# Patient Record
Sex: FEMALE | Race: WHITE | Employment: OTHER | URBAN - METROPOLITAN AREA
[De-identification: names, ages, dates, MRNs, and addresses within clinical notes are randomized per-mention and may not be internally consistent; named-entity substitution may affect disease eponyms.]

---

## 2020-07-13 VITALS
SYSTOLIC BLOOD PRESSURE: 111 MMHG | DIASTOLIC BLOOD PRESSURE: 61 MMHG | OXYGEN SATURATION: 95 % | HEART RATE: 49 BPM | RESPIRATION RATE: 16 BRPM | TEMPERATURE: 96.9 F

## 2020-07-13 RX ORDER — DULOXETIN HYDROCHLORIDE 30 MG/1
30 CAPSULE, DELAYED RELEASE ORAL DAILY
COMMUNITY

## 2020-07-13 RX ORDER — LOSARTAN POTASSIUM 100 MG/1
100 TABLET ORAL DAILY
COMMUNITY

## 2020-07-13 RX ORDER — DALFAMPRIDINE 10 MG/1
10 TABLET, FILM COATED, EXTENDED RELEASE ORAL 2 TIMES DAILY
COMMUNITY
End: 2020-12-09

## 2020-07-13 RX ORDER — ASPIRIN 325 MG
TABLET, DELAYED RELEASE (ENTERIC COATED) ORAL DAILY
COMMUNITY

## 2020-07-13 RX ORDER — NYSTATIN 100000 [USP'U]/G
1 POWDER TOPICAL 2 TIMES DAILY PRN
COMMUNITY

## 2020-07-13 RX ORDER — ACETAMINOPHEN 500 MG
1000 TABLET ORAL 3 TIMES DAILY
COMMUNITY

## 2020-07-13 RX ORDER — CHOLECALCIFEROL (VITAMIN D3) 50 MCG
4000 TABLET ORAL DAILY
COMMUNITY

## 2020-07-13 RX ORDER — TERIFLUNOMIDE 14 MG/1
14 TABLET, FILM COATED ORAL DAILY
COMMUNITY

## 2020-07-13 RX ORDER — POLYETHYLENE GLYCOL 3350 17 G/17G
17 POWDER, FOR SOLUTION ORAL DAILY
COMMUNITY

## 2020-07-13 RX ORDER — MODAFINIL 200 MG/1
200 TABLET ORAL DAILY
COMMUNITY

## 2020-07-13 RX ORDER — HYDROCHLOROTHIAZIDE 25 MG/1
25 TABLET ORAL DAILY
COMMUNITY

## 2020-07-13 RX ORDER — PANTOPRAZOLE SODIUM 20 MG/1
20 TABLET, DELAYED RELEASE ORAL DAILY
COMMUNITY

## 2020-07-13 RX ORDER — FLUCONAZOLE 150 MG/1
150 TABLET ORAL ONCE
COMMUNITY
Start: 2020-07-06 | End: 2020-07-17

## 2020-07-13 RX ORDER — METHENAMINE HIPPURATE 1000 MG/1
1 TABLET ORAL 2 TIMES DAILY
COMMUNITY

## 2020-07-13 NOTE — NURSING NOTE
Chief Complaint   Patient presents with     longterm Regulatory       Initial /61   Pulse (!) 49   Temp 96.9  F (36.1  C)   Resp 16   SpO2 95%  There is no height or weight on file to calculate BMI.  Medication Reconciliation: complete     Liliana Grossman LPN

## 2020-07-16 ENCOUNTER — TRANSFERRED RECORDS (OUTPATIENT)
Dept: HEALTH INFORMATION MANAGEMENT | Facility: CLINIC | Age: 55
End: 2020-07-16

## 2020-07-16 ENCOUNTER — VIRTUAL VISIT (OUTPATIENT)
Dept: FAMILY MEDICINE | Facility: OTHER | Age: 55
End: 2020-07-16
Attending: FAMILY MEDICINE
Payer: COMMERCIAL

## 2020-07-16 DIAGNOSIS — G35 MULTIPLE SCLEROSIS (H): ICD-10-CM

## 2020-07-16 DIAGNOSIS — N31.9 NEUROGENIC BLADDER: ICD-10-CM

## 2020-07-16 DIAGNOSIS — Z78.9 NURSING HOME RESIDENT: Primary | ICD-10-CM

## 2020-07-16 PROCEDURE — 99304 1ST NF CARE SF/LOW MDM 25: CPT | Mod: 95 | Performed by: FAMILY MEDICINE

## 2020-07-28 PROBLEM — Z78.9 NURSING HOME RESIDENT: Status: ACTIVE | Noted: 2020-07-28

## 2020-07-28 NOTE — PROGRESS NOTES
"Savita Jones is a 55 year old female who is being evaluated via a billable video visit.      The patient has been notified of following:     \"This video visit will be conducted via a call between you and your physician/provider. We have found that certain health care needs can be provided without the need for an in-person physical exam.  This service lets us provide the care you need with a video conversation.  If a prescription is necessary we can send it directly to your pharmacy.  If lab work is needed we can place an order for that and you can then stop by our lab to have the test done at a later time.    Video visits are billed at different rates depending on your insurance coverage.  Please reach out to your insurance provider with any questions.    If during the course of the call the physician/provider feels a video visit is not appropriate, you will not be charged for this service.\"    Patient has given verbal consent for Video visit? Yes  How would you like to obtain your AVS? Mail a copy  If you are dropped from the video visit, the video invite should be resent to: Other e-mail: [email or web address]  Will anyone else be joining your video visit? No      Video-Visit Details    Type of service:  Video Visit    Video Start Time: 1045  Video End Time: 1100    Originating Location (pt. Location): Long term Care    Distant Location (provider [facility]     Platform used for Video Visit: JoySiving Egil [employer]v[place]berg                  HISTORY OF PRESENT ILLNESS:  Savita is a 55 year old female (1965)  resident of [facility] who is being seen today for routine 30 day follow up.     She has a history of multiple sclerosis complicated by cognitive dysfunction as well as neurogenic bladder.  She also has hypertension.     The patient notes worsening joint pain as well as bladder issues.  She is concerned about persistent UTI..        Discussed with nursing staff who note no other " issues.    The patient is seen in her room.  Family is not present.     Medical records are reviewed.    Current medications, allergies, and interdisciplinary care plan are reviewed.      Patient Active Problem List    Diagnosis Date Noted     Nursing Home Visit 07/28/2020     Priority: Medium          Social History     Socioeconomic History     Marital status: Single     Spouse name: Not on file     Number of children: Not on file     Years of education: Not on file     Highest education level: Not on file   Occupational History     Not on file   Social Needs     Financial resource strain: Not on file     Food insecurity     Worry: Not on file     Inability: Not on file     Transportation needs     Medical: Not on file     Non-medical: Not on file   Tobacco Use     Smoking status: Not on file   Substance and Sexual Activity     Alcohol use: Not on file     Drug use: Not on file     Sexual activity: Not on file   Lifestyle     Physical activity     Days per week: Not on file     Minutes per session: Not on file     Stress: Not on file   Relationships     Social connections     Talks on phone: Not on file     Gets together: Not on file     Attends Voodoo service: Not on file     Active member of club or organization: Not on file     Attends meetings of clubs or organizations: Not on file     Relationship status: Not on file     Intimate partner violence     Fear of current or ex partner: Not on file     Emotionally abused: Not on file     Physically abused: Not on file     Forced sexual activity: Not on file   Other Topics Concern     Not on file   Social History Narrative     Not on file        Current Outpatient Medications   Medication Sig     acetaminophen (TYLENOL) 500 MG tablet Take 1,000 mg by mouth 3 times daily Maintain 4 hours between doses     Ascorbic Acid (QC VITAMIN C WITH SUSANNA HIPS PO) Take 2,000 mg by mouth 2 times daily     aspirin (ASA) 81 MG chewable tablet Take 81 mg by mouth daily      BACLOFEN PO Take 40 mg by mouth 3 times daily     Cholecalciferol (VITAMIN D3) 50 MCG (2000 UT) TABS Take 4,000 Units by mouth daily     D-MANNOSE PO Take 1,000 mg by mouth 2 times daily     Dalfampridine 10 MG TB12 Take 10 mg by mouth 2 times daily     DULoxetine (CYMBALTA) 30 MG capsule Take 30 mg by mouth daily     hydrochlorothiazide (HYDRODIURIL) 25 MG tablet Take 25 mg by mouth daily     losartan (COZAAR) 100 MG tablet Take 100 mg by mouth daily     methenamine (HIPREX) 1 g tablet Take 1 g by mouth 2 times daily     modafinil (PROVIGIL) 200 MG tablet Take 200 mg by mouth daily     nystatin (MYCOSTATIN) 632572 UNIT/GM external powder Apply 1 g topically 2 times daily as needed (for skin changes)     pantoprazole (PROTONIX) 20 MG EC tablet Take 40 mg by mouth daily     polyethylene glycol (MIRALAX) 17 g packet Take 17 g by mouth daily     teriflunomide (AUBAGIO) 14 MG tablet Take 14 mg by mouth daily Only available through select specialty pharmacies     No current facility-administered medications for this visit.        Allergies   Allergen Reactions     Lisinopril      Penicillins      Sulfa Drugs        I have reviewed the MDS and I have reviewed the care plan and do agree with the plan.      ROS:  No chest pain, shortness of breath, fever, chills, headache, nausea, vomiting, dysuria, or changes in bowel habits.  Appetite is normal.  Diffuse joint pain noted.          OBJECTIVE:  /61   Pulse (!) 49   Temp 96.9  F (36.1  C)   Resp 16   SpO2 95%     GENERAL: Healthy, alert and no distress  EYES: Eyes grossly normal to inspection.  No discharge or erythema, or obvious scleral/conjunctival abnormalities.  RESP: No audible wheeze, cough, or visible cyanosis.  No visible retractions or increased work of breathing.    SKIN: Visible skin clear. No significant rash, abnormal pigmentation or lesions.  NEURO: Cranial nerves grossly intact.  Mentation and speech appropriate for age.  PSYCH: Mentation appears  normal, affect normal/bright, judgement and insight intact, normal speech and appearance well-groomed.            Lab/Diagnostic data:    Reviewed    ASSESSMENT/ORDERS:  Nursing Home Visit  Discussed with staff. Care plan reviewed and orders updated.  Chronic issues are stable. Routine 30 day Nursing Home follow up.     Multiple sclerosis (H)  Records reviewed.  Continue same medications.    Neurogenic bladder  UA taken. Will await culture        Total time spent with patient visit was 25 min including patient visit, review of pertinent clinical information, and treatment plan.      Kody Ford MD FAAFP [facility]

## 2020-12-19 DIAGNOSIS — N31.9 NEUROGENIC BLADDER: Primary | ICD-10-CM

## 2020-12-21 NOTE — TELEPHONE ENCOUNTER
cipro      Last Written Prescription Date:  Patient requested  Last Fill Quantity: 0,   # refills: 0  Last Office Visit: 7/16/20  Future Office visit:

## 2020-12-22 RX ORDER — CIPROFLOXACIN 250 MG/1
TABLET, FILM COATED ORAL
Qty: 14 TABLET | Refills: 1 | Status: SHIPPED | OUTPATIENT
Start: 2020-12-22

## 2020-12-31 ENCOUNTER — VIRTUAL VISIT (OUTPATIENT)
Dept: FAMILY MEDICINE | Facility: OTHER | Age: 55
End: 2020-12-31
Attending: FAMILY MEDICINE
Payer: COMMERCIAL

## 2020-12-31 DIAGNOSIS — L89.322 PRESSURE INJURY OF LEFT BUTTOCK, STAGE 2 (H): Primary | ICD-10-CM

## 2020-12-31 PROCEDURE — 99308 SBSQ NF CARE LOW MDM 20: CPT | Mod: 95 | Performed by: FAMILY MEDICINE

## 2021-02-07 NOTE — PROGRESS NOTES
"Savita Jones is a 55 year old female who is being evaluated via a billable video visit.      The patient has been notified of following:     \"This video visit will be conducted via a call between you and your physician/provider. We have found that certain health care needs can be provided without the need for an in-person physical exam.  This service lets us provide the care you need with a video conversation.  If a prescription is necessary we can send it directly to your pharmacy.  If lab work is needed we can place an order for that and you can then stop by our lab to have the test done at a later time.    Video visits are billed at different rates depending on your insurance coverage.  Please reach out to your insurance provider with any questions.    If during the course of the call the physician/provider feels a video visit is not appropriate, you will not be charged for this service.\"    Patient has given verbal consent for Video visit? Yes  How would you like to obtain your AVS? Mail a copy  If you are dropped from the video visit, the video invite should be resent to: Other e-mail: [email or web address]  Will anyone else be joining your video visit? No      Video-Visit Details    Type of service:  Video Visit    Video Start Time: 1045  Video End Time: 1100    Originating Location (pt. Location): Long term Care    Distant Location (provider location):  [facility]     Platform used for Video Visit: FaceTime        HISTORY OF PRESENT ILLNESS:  Savita is a 55 year old female (1965)  resident of [facility] who is being seen today for evaluation of the following wounds:    She has a history of multiple sclerosis complicated by cognitive dysfunction as well as neurogenic bladder.  She also has hypertension.     Pressure injury, left buttock      Savita has developed a pressure injury of the left buttock. She has a history of MS and has refused repositioning.  This measures " 2.8 cm by 0.4 cm.  This has been treated with Allevyn as well as A and D ointment to the surrounding tissue.    Discussed with nursing staff who note no other issues.    The patient is seen in her room.  Family is not present.     Medical records are reviewed.    Current medications, allergies, and interdisciplinary care plan are reviewed.      Patient Active Problem List    Diagnosis Date Noted     Multiple sclerosis (H) 12/13/2006     Priority: High     Essential hypertension 06/22/2015     Priority: Medium     Cognitive dysfunction 11/08/2011     Priority: Medium     IMO Update 10/11       Neurogenic bladder 08/26/2011     Priority: Medium     Incomplete bladder emptying 08/17/2011     Priority: Medium     IMO Update 10/11       Urge incontinence of urine 08/16/2011     Priority: Medium     IMO Update 10/11       Memory difficulties 07/13/2011     Priority: Medium     IMO Update 10/11       Nursing Home Visit 07/28/2020     Priority: Low          Social History     Socioeconomic History     Marital status:      Spouse name: Not on file     Number of children: Not on file     Years of education: Not on file     Highest education level: Not on file   Occupational History     Not on file   Social Needs     Financial resource strain: Not on file     Food insecurity     Worry: Not on file     Inability: Not on file     Transportation needs     Medical: Not on file     Non-medical: Not on file   Tobacco Use     Smoking status: Not on file   Substance and Sexual Activity     Alcohol use: Not on file     Drug use: Not on file     Sexual activity: Not on file   Lifestyle     Physical activity     Days per week: Not on file     Minutes per session: Not on file     Stress: Not on file   Relationships     Social connections     Talks on phone: Not on file     Gets together: Not on file     Attends Jehovah's witness service: Not on file     Active member of club or organization: Not on file     Attends meetings of clubs or  organizations: Not on file     Relationship status: Not on file     Intimate partner violence     Fear of current or ex partner: Not on file     Emotionally abused: Not on file     Physically abused: Not on file     Forced sexual activity: Not on file   Other Topics Concern     Not on file   Social History Narrative     Not on file        Current Outpatient Medications   Medication Sig     acetaminophen (TYLENOL) 500 MG tablet Take 1,000 mg by mouth 3 times daily & PRN TID Maintain 4 hours between doses     Ascorbic Acid (QC VITAMIN C WITH SUSANNA HIPS PO) Take 2,000 mg by mouth 2 times daily     aspirin (ASA) 325 MG EC tablet Take by mouth daily      BACLOFEN PO Take 40 mg by mouth 3 times daily     Cholecalciferol (VITAMIN D3) 50 MCG (2000 UT) TABS Take 4,000 Units by mouth daily     ciprofloxacin (CIPRO) 250 MG tablet TAKE 1 TABLET BY MOUTH TWICE A DAY FOR 7 DAYS     D-MANNOSE PO Take 1,000 mg by mouth 2 times daily     DULoxetine (CYMBALTA) 30 MG capsule Take 30 mg by mouth daily     estrogen conj-medroxyPROGESTERone (PREMPRO) 0.3-1.5 MG tablet Take 1 tablet by mouth daily     furosemide (LASIX) 20 MG tablet Take 20 mg by mouth daily     hydrochlorothiazide (HYDRODIURIL) 25 MG tablet Take 25 mg by mouth daily     losartan (COZAAR) 100 MG tablet Take 100 mg by mouth daily     melatonin 5 MG tablet Take 5 mg by mouth At Bedtime     meloxicam (MOBIC) 15 MG tablet Take 15 mg by mouth daily     methenamine (HIPREX) 1 g tablet Take 1 g by mouth 2 times daily     modafinil (PROVIGIL) 200 MG tablet Take 200 mg by mouth daily     nystatin (MYCOSTATIN) 978051 UNIT/GM external cream Apply topically as needed for dry skin     nystatin (MYCOSTATIN) 230034 UNIT/GM external powder Apply 1 g topically 2 times daily as needed (for skin changes)     pantoprazole (PROTONIX) 20 MG EC tablet Take 20 mg by mouth daily      polyethylene glycol (MIRALAX) 17 g packet Take 17 g by mouth daily     teriflunomide (AUBAGIO) 14 MG tablet Take 14  mg by mouth daily Only available through select specialty pharmacies     No current facility-administered medications for this visit.        Allergies   Allergen Reactions     Ciprofloxacin Other (See Comments)     Patient states Cipro makes her feel like her heart is racing.       Lisinopril Cough     Sulfa Drugs      Allergy to Sulfa.     Penicillins Rash       I have reviewed the MDS and I have reviewed the care plan and do agree with the plan.      ROS:  No chest pain, shortness of breath, fever, chills, headache, nausea, vomiting, dysuria, or changes in bowel habits.  Appetite is normal.  Diffuse joint pain noted.          OBJECTIVE:  There were no vitals taken for this visit.    GENERAL: Healthy, alert and no distress  EYES: Eyes grossly normal to inspection.  No discharge or erythema, or obvious scleral/conjunctival abnormalities.  RESP: No audible wheeze, cough, or visible cyanosis.  No visible retractions or increased work of breathing.    SKIN: Visible skin clear. No significant rash, abnormal pigmentation or lesions.  NEURO: Cranial nerves grossly intact.  Mentation and speech appropriate for age.  PSYCH: Mentation appears normal, affect normal/bright, judgement and insight intact, normal speech and appearance well-groomed.            Lab/Diagnostic data:    Reviewed    ASSESSMENT/ORDERS:  Pressure injury of left buttock, stage 2 (H)  Discussed the improtance of repositioning to patient.  Continue wound care as described above.  Follow.          Total time spent with patient visit was 25 min including patient visit, review of pertinent clinical information, and treatment plan.      Kody Ford MD FAAFP [facility]